# Patient Record
Sex: FEMALE | Race: WHITE | Employment: STUDENT
[De-identification: names, ages, dates, MRNs, and addresses within clinical notes are randomized per-mention and may not be internally consistent; named-entity substitution may affect disease eponyms.]

---
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
## 2022-03-06 ENCOUNTER — HEALTH MAINTENANCE LETTER (OUTPATIENT)
Age: 22
End: 2022-03-06

## 2022-06-06 ASSESSMENT — ANXIETY QUESTIONNAIRES
7. FEELING AFRAID AS IF SOMETHING AWFUL MIGHT HAPPEN: SEVERAL DAYS
4. TROUBLE RELAXING: NEARLY EVERY DAY
8. IF YOU CHECKED OFF ANY PROBLEMS, HOW DIFFICULT HAVE THESE MADE IT FOR YOU TO DO YOUR WORK, TAKE CARE OF THINGS AT HOME, OR GET ALONG WITH OTHER PEOPLE?: NOT DIFFICULT AT ALL
GAD7 TOTAL SCORE: 13
GAD7 TOTAL SCORE: 13
2. NOT BEING ABLE TO STOP OR CONTROL WORRYING: MORE THAN HALF THE DAYS
6. BECOMING EASILY ANNOYED OR IRRITABLE: MORE THAN HALF THE DAYS
3. WORRYING TOO MUCH ABOUT DIFFERENT THINGS: MORE THAN HALF THE DAYS
5. BEING SO RESTLESS THAT IT IS HARD TO SIT STILL: MORE THAN HALF THE DAYS
7. FEELING AFRAID AS IF SOMETHING AWFUL MIGHT HAPPEN: SEVERAL DAYS
1. FEELING NERVOUS, ANXIOUS, OR ON EDGE: SEVERAL DAYS
GAD7 TOTAL SCORE: 13

## 2022-06-08 ENCOUNTER — VIRTUAL VISIT (OUTPATIENT)
Dept: PSYCHOLOGY | Facility: CLINIC | Age: 22
End: 2022-06-08
Payer: COMMERCIAL

## 2022-06-08 DIAGNOSIS — R41.840 ATTENTION DEFICIT: ICD-10-CM

## 2022-06-08 PROCEDURE — 90834 PSYTX W PT 45 MINUTES: CPT | Mod: 95 | Performed by: PSYCHOLOGIST

## 2022-06-12 NOTE — PROGRESS NOTES
"[facility]   Mental Health & Addiction Services     Progress Note - Initial Visit    Patient  Name:  Cookie Wall Date: 22         Service Type: Individual     Visit Start Time: 1:07pm Visit End Time: 1:51pm    Visit #: 1 44 minutes    Attendees: Client attended alone    Service Modality:  Video Visit:      Provider verified identity through the following two step process.  Patient provided:  Patient photo and Patient     Telemedicine Visit: The patient's condition can be safely assessed and treated via synchronous audio and visual telemedicine encounter.      Reason for Telemedicine Visit: Services only offered telehealth    Originating Site (Patient Location): Patient's home    Distant Site (Provider Location): Provider Remote Setting- Home Office    Consent:  The patient/guardian has verbally consented to: the potential risks and benefits of telemedicine (video visit) versus in person care; bill my insurance or make self-payment for services provided; and responsibility for payment of non-covered services.     Patient would like the video invitation sent by:  Send to e-mail at: [email or web address]    Mode of Communication:  Video Conference via [facility]    As the provider I attest to compliance with applicable laws and regulations related to telemedicine.       DATA:   Interactive Complexity: No   Crisis: No     Presenting Concerns/  Current Stressors:   \"I was having struggling with my academics.\"      ASSESSMENT:  Mental Status Assessment:  Appearance:   Appropriate   Eye Contact:   Good   Psychomotor Behavior: Normal   Attitude:   Cooperative  Pleasant  Orientation:   All  Speech   Rate / Production: Talkative   Volume:  Normal   Mood:    Anxious   Affect:    Appropriate   Thought Content:  Clear   Thought Form:  Coherent   Insight:    Good  and Fair       Safety Issues and Plan for Safety and Risk Management:     [place] Suicide Severity Rating Scale " "(Lifetime/Recent)  [place] Suicide Severity Rating (Lifetime/Recent) 6/8/2022   1. Wish to be Dead (Lifetime) 1   Wish to be Dead Description (Lifetime) MRE: [id number] COVID, \"Just the thought of, If I did this what would be the consequence?\"   1. Wish to be Dead (Past 1 Month) 0   2. Non-Specific Active Suicidal Thoughts (Lifetime) 0   Most Severe Ideation Rating (Lifetime) 2   Description of Most Severe Ideation (Lifetime) MRE: [id number] COVID, \"Just the thought of, If I did this what would be the consequence?\"   Description of Most Severe Ideation (Past 1 Month) N/A   Frequency (Lifetime) 1   Duration (Lifetime) 2   Controllability (Lifetime) 1   Deterrents (Lifetime) 1   Deterrents (Past 1 Month) 0   Reasons for Ideation (Lifetime) 0   Actual Attempt (Lifetime) 0   Has subject engaged in non-suicidal self-injurious behavior? (Lifetime) 0   Interrupted Attempts (Lifetime) 0   Aborted or Self-Interrupted Attempt (Lifetime) 0   Preparatory Acts or Behavior (Lifetime) 0   Calculated C-SSRS Risk Score (Lifetime/Recent) No Risk Indicated     Patient denies current fears or concerns for personal safety.  Patient denies current or recent suicidal ideation or behaviors.  Patient denies current or recent homicidal ideation or behaviors.  Patient denies current or recent self injurious behavior or ideation.  Patient denies other safety concerns.  Recommended that patient call 911 or go to the local ED should there be a change in any of these risk factors.  Patient reports there are no firearms in the house.     Diagnostic Criteria:  Attention Deficit Hyperactivity Disorder  A) A persistent pattern of inattention and/or hyperactivity-impulsivity that interferes with functioning or development, as characterized by (1) Inattention and/or (2) Hyperactivity and Impulsivity  - Often fails to give close attention to details or makes careless mistakes in schoolwork, at work, or during other activities  - Often has " "difficulty sustaining attention in tasks or play activities  - Often does not follow through on instructions and fails to finish schoolwork, chores, or duties in the workplace  - Often has difficulty organizing tasks and activities  - Often loses things necessary for tasks or activities  - Is often easily distractedby extraneous stimuli  - Is often forgetful in daily activities  E) The Symptoms do not occur exclusively during the course of schizophrenia or another psychotic disorder and are not better explained by another mental disorder      DSM5 Diagnoses: (Sustained by DSM5 Criteria Listed Above)  Diagnoses: Attention-Deficit/Hyperactivity Disorder  314.00 (F90.0) Predominantly inattentive presentation  (Provisional)  Psychosocial & Contextual Factors: \"I was having struggling with my academics.\"  WHODAS 2.0 (12 item):   WHODAS 2.0 Total Score 6/6/2022   Total Score 30   Total Score [facility] 30     Intervention:   Psychoeducation; Skill review/identification & recommendation; Pattern recognition; Socratic Questioning; Active listening, validation, and other rapport building skills; Administer and explain screeners  Collateral Reports Completed:  Not Applicable      PLAN: (Homework, other):  1. Provider will continue Diagnostic Assessment.  Patient was given the following to do until next session:  The client was sent the MMPI-2 and ADHD questionnaires to complete and return by next session, if possible.    2. Provider recommended the following referrals: None at this time.      3.  Suicide Risk and Safety Concerns were assessed for Cookie Wall.    Patient meets the following risk assessment and triage: Patient denied any current/recent/lifetime history of suicidal ideation and/or behaviors.  No safety plan indicated at this time.       Anthony Ga  June 8, 2022        "

## 2022-06-14 ASSESSMENT — PATIENT HEALTH QUESTIONNAIRE - PHQ9
SUM OF ALL RESPONSES TO PHQ QUESTIONS 1-9: 13
SUM OF ALL RESPONSES TO PHQ QUESTIONS 1-9: 13

## 2022-06-15 ENCOUNTER — VIRTUAL VISIT (OUTPATIENT)
Dept: PSYCHOLOGY | Facility: CLINIC | Age: 22
End: 2022-06-15
Payer: COMMERCIAL

## 2022-06-15 DIAGNOSIS — F33.0 MAJOR DEPRESSIVE DISORDER, RECURRENT EPISODE, MILD (H): Primary | ICD-10-CM

## 2022-06-15 DIAGNOSIS — F41.9 ANXIETY DISORDER, UNSPECIFIED TYPE: ICD-10-CM

## 2022-06-15 DIAGNOSIS — F90.0 ATTENTION DEFICIT HYPERACTIVITY DISORDER, INATTENTIVE TYPE: ICD-10-CM

## 2022-06-15 PROCEDURE — 90791 PSYCH DIAGNOSTIC EVALUATION: CPT | Mod: 95 | Performed by: PSYCHOLOGIST

## 2022-06-15 NOTE — PROGRESS NOTES
[facility]    Provider Name:  Anthony Ga PsyD     Credentials:        UNIVERSAL ADULT ADHD/Mental Health DIAGNOSTIC ASSESSMENT      Patient Name:  Cookie Wall  Date: 6/15/22  PREFERRED NAME: Cookie  PRONOUNS:  she/her/hers     MRN: [id number]  : 2000  ADDRESS: [address] [facility] [address]  ACCT. NUMBER:  [id number]  PREFERRED PHONE: [phone]  May we leave a program related message: Yes       Service Type: Individual      Session Start Time: 5:01pm Session End Time: 5:54pm     Session Length: 53 minutes    Session #: 2    Attendees: Client attended alone    Service Modality:  Video Visit:      Provider verified identity through the following two step process.  Patient provided:  Patient photo    Telemedicine Visit: The patient's condition can be safely assessed and treated via synchronous audio and visual telemedicine encounter.      Reason for Telemedicine Visit: Services only offered telehealth    Originating Site (Patient Location): Patient's home    Distant Site (Provider Location): Provider Remote Setting- Home Office    Consent:  The patient/guardian has verbally consented to: the potential risks and benefits of telemedicine (video visit) versus in person care; bill my insurance or make self-payment for services provided; and responsibility for payment of non-covered services.     Patient would like the video invitation sent by:  Send to e-mail at: [email or web address]    Mode of Communication:  Video Conference via [facility]    As the provider I attest to compliance with applicable laws and regulations related to telemedicine.    DATA  Interactive Complexity: No  Crisis: No      Identifying Information:  Patient is a 21 year old,  .  The pronoun use throughout this assessment reflects the patient's chosen pronoun.  Patient was referred for an assessment by self.  Patient attended the session alone.    Chief Complaint:   The purpose of this  "evaluation is to: clarify diagnosis. Patient reported seeking services at this time for diagnostic assessment and recommendations for treatment.  Patient reported that   she   has not completed a previous ADHD diagnostic assessment.  Patient has received a previous diagnosis of depression. Patient reported that medication has not been prescribed medication to address these problems.     CONCERNS:  \"I was having struggling with my academics.  Any time a teacher started taking I would just stare off in space.  I had fidget cube I would use that helped me listen.  I would tap my hand or pencil to do something.  They (teachers) didn't like it because they called it a distraction.  If I didn't listen to music (ambiance) I wouldn't get my homework done.  It helps me focus.  \"  She recalls struggling with \"tapping my foot all hour\" was in middle-school.  She also recalled struggling quite a bit in school, \"I got C & Ds.  I did well with History and English.\"    Social/Family History:  Patient reported they grew up in [place].  They were raised by biological parents.  Parents were always together.  Patient reported that their childhood was \"pretty happy.\"  Patient described their current relationships with family of origin as (one older brother): \" Getting steadily better.  We were jealous of each other.  The relationship with my parents is a lot better.\"     Patient described her childhood family environment as \"very stable.\"  As a child, patient reported that she failed to complete assigned chores in the home environment, had problems with organization and keeping track of items, misplaced or lost things, forgot school work or other items between home and school, needed frequent reminders by parents to be motivated or to complete work and had difficulty managing personal hygiene. Patient reported difficulty with childhood peer relationships (\"I was called annoying because I was trying to make friends\").     The " "patient describes their cultural background as .  Cultural influences and impact on patient's life structure, values, norms, and healthcare: I grew up Anglican Islam but have recently transitioned to Paganism.  Contextual influences on patient's health include: None reported.    These factors will be addressed in the Preliminary Treatment plan.  Patient identified their preferred language to be English. Patient reported they does not need the assistance of an  or other support involved in therapy.     Patient reported had no significant delays in developmental tasks.   Patient's highest education level was high school graduate  . Patient identified the following learning problems: attention, concentration and \"I was called a problem child.\".  Modifications will not be used to assist communication in therapy.  Patient reports they are able to understand written materials.    Patient did not receive tutoring services during the school years. Patient did not receive special education services. Patient  reported significant behavior and discipline problems including: disruptive classroom behavior and failure to finish or complete homework. Patient did attend post secondary school. She did attend time in college but was unable to complete the work so she is \"taking a gap year.\"    Patient reported the following relationship history of \"two\" committed and significant relationship.  Patient's current relationship status is in a relationship for 3 years.   Patient identified their sexual orientation as bi-sexual.  Patient reported having zero child(jossy). Patient identified partner; mother; father; siblings; friends as part of their support system.  Patient identified the quality of these relationships as good.      Patient's current living/housing situation involves staying with someone. The immediate members of family and household include Michelle Wall, 46,Mother , father, and brother, and they " "report that housing is stable    Patient is currently employed part time.  Patient reports their finances are obtained through employment; parents. The patient's work history includes: \" I worked at [employer] for 3 1/2 months during the summer than at [employer] for about a year.  When we moved I started working at [employer] and I was having trouble with a co-worker and could only work every other week, my coworker said all I did was talk when it was her that was always talking.\"  The longest period of employment has been 1 year.  Client has been terminated from a place of employment ([employer]).  Patient does not identify finances as a current stressor.      Patient reported that theyhave not been involved with the legal system. Patient does not being under probation/ parole/ jurisdiction. They are not under any current court jurisdiction. .    Patient has not received a 's license.  Patient N/A.  Patient reported the following driving habits: N/A.  According to client, other people N/A comfortable riding as passengers when she is driving.    Patient's Strengths and Limitations:  Patient identified the following strengths or resources that will help them succeed in treatment: commitment to health and well being, family support and Assertiveness. Things that may interfere with the patient's success in treatment include: \"I think just forgetting going to therapy or I forget what meds to take.\".     Assessments:  The following assessments were completed by patient for this visit:  PHQ9:   PHQ-9 SCORE 3/9/2021 6/8/2022 6/14/2022   PHQ-9 Total Score MyChart - - 13 (Moderate depression)   PHQ-9 Total Score 18 14 13     GAD7:   MAIKOL-7 SCORE 6/6/2022   Total Score 13 (moderate anxiety)   Total Score 13     CAGE-AID:   CAGE-AID Total Score 6/6/2022   Total Score 0   Total Score MyChart 0 (A total score of 2 or greater is considered clinically significant)     PROMIS 10-Global Health (all questions and " answers displayed):   PROMIS 10 6/8/2022 6/15/2022   In general, would you say your health is: - Good   In general, would you say your quality of life is: - Excellent   In general, how would you rate your physical health? - Good   In general, how would you rate your mental health, including your mood and your ability to think? - Fair   In general, how would you rate your satisfaction with your social activities and relationships? - Good   In general, please rate how well you carry out your usual social activities and roles - Good   To what extent are you able to carry out your everyday physical activities such as walking, climbing stairs, carrying groceries, or moving a chair? - Completely   How often have you been bothered by emotional problems such as feeling anxious, depressed or irritable? - Never   How would you rate your fatigue on average? - None   How would you rate your pain on average?   0 = No Pain  to  10 = Worst Imaginable Pain - 0   In general, would you say your health is: 4 3   In general, would you say your quality of life is: 5 5   In general, how would you rate your physical health? 4 3   In general, how would you rate your mental health, including your mood and your ability to think? 3 2   In general, how would you rate your satisfaction with your social activities and relationships? 3 3   In general, please rate how well you carry out your usual social activities and roles. (This includes activities at home, at work and in your community, and responsibilities as a parent, child, spouse, employee, friend, etc.) 1 3   To what extent are you able to carry out your everyday physical activities such as walking, climbing stairs, carrying groceries, or moving a chair? 5 5   In the past 7 days, how often have you been bothered by emotional problems such as feeling anxious, depressed, or irritable? 3 1   In the past 7 days, how would you rate your fatigue on average? 2 1   In the past 7 days, how would  "you rate your pain on average, where 0 means no pain, and 10 means worst imaginable pain? 0 0   Global Mental Health Score 14 15   Global Physical Health Score 18 18   PROMIS TOTAL - SUBSCORES 32 33   Some recent data might be hidden     [place] Suicide Severity Rating Scale (Lifetime/Recent)  [place] Suicide Severity Rating (Lifetime/Recent) 6/8/2022   1. Wish to be Dead (Lifetime) 1   Wish to be Dead Description (Lifetime) MRE: [id number] COVID, \"Just the thought of, If I did this what would be the consequence?\"   1. Wish to be Dead (Past 1 Month) 0   2. Non-Specific Active Suicidal Thoughts (Lifetime) 0   Most Severe Ideation Rating (Lifetime) 2   Description of Most Severe Ideation (Lifetime) MRE: [id number] COVID, \"Just the thought of, If I did this what would be the consequence?\"   Description of Most Severe Ideation (Past 1 Month) N/A   Frequency (Lifetime) 1   Duration (Lifetime) 2   Controllability (Lifetime) 1   Deterrents (Lifetime) 1   Deterrents (Past 1 Month) 0   Reasons for Ideation (Lifetime) 0   Actual Attempt (Lifetime) 0   Has subject engaged in non-suicidal self-injurious behavior? (Lifetime) 0   Interrupted Attempts (Lifetime) 0   Aborted or Self-Interrupted Attempt (Lifetime) 0   Preparatory Acts or Behavior (Lifetime) 0   Calculated C-SSRS Risk Score (Lifetime/Recent) No Risk Indicated       Personal and Family Medical History:  Patient does report a family history of mental health concerns.  Patient reports family history includes Allergies in her father and mother; Anxiety Disorder in her mother; Autism Spectrum Disorder in her brother; Breast Cancer in her paternal grandmother; Cancer in an other family member; Depression in her brother and mother; Depression/Anxiety in her mother; Diabetes in her mother; Heart Disease in her father and other family members; Hyperlipidemia in her father and paternal grandfather; Hypertension in her father, maternal grandfather, paternal " "grandfather, and another family member; Mental Illness in her brother and mother; Obesity in her mother; Other Cancer in her paternal grandfather..     Patient does report Mental Health Diagnosis and/or Treatment.  Patient Patient reported the following previous diagnoses which include(s): an Anxiety Disorder and Depression.  Patient reported symptoms began: DEPRESSION (MDD): \"I think it was after I started middle-school.  My grandpa and cousin , back-to-back, from cancer.  \"I don't feel anything.  I feel numb.\" Loses interest, feel down, isolate/withdraw, \"just stay in my room,\" \"it goes off-and-on, sometimes 2-3 days, sometimes 2-weeks, incr appetite, incr sleep, lowered self-esteem, irritable.  ANXIETY (Unspecified Anxiety Disorder): \"I think it happens with things that are out of my control.  I used to get more anxious when I had to ask for help from someone.  In person, \"I'm afraid I'm asking a dumb question.\"  It doesn't always happen. \"I worry I might be boring, or I'll saying something embarrassing.\"  Social anxiety \"interferes a little bit (with life).\"  Patient has received mental health services in the past: \"therapy for a few months\".  Psychiatric Hospitalizations: None.  Patient denies a history of civil commitment.  Patient is not receiving other mental health services.  These include none.         Patient has had a physical exam to rule out medical causes for current symptoms.  Date of last physical exam was within the past year. Client was encouraged to follow up with PCP if symptoms were to develop. The patient has a [place] Primary Care Provider, who is named Lauro Haile..  Patient reports no current medical concerns.  Patient denies any issues with pain..   There are not significant appetite / nutritional concerns / weight changes.   Patient does not report a history of head injury / trauma / cognitive impairment.    Patient reports not taking any current medications    Medication " "Adherence:  Patient reports not currently prescribed.  N/A.    Patient Allergies:    Allergies   Allergen Reactions     Cats Shortness Of Breath and Hives     Grass Itching     Trees      Sneezing, runny nose     Dogs Hives and Itching     Runny nose,      Dust Mites      Itchy eyes        Medical History:    Past Medical History:   Diagnosis Date     Acute bronchiolitis due to other infectious organisms 12/6/2003     Anal fissure 11/4/2005     Bacteremia 12/6/2003    Moraxella sp.     FEBRILE SEIZURE 12/3/2003     Pneumonia, organism unspecified(486) 12/6/2003         Current Mental Status Exam:   Appearance:  Appropriate    Eye Contact:  Fair   Psychomotor:  Normal       Gait / station:  Unable to observe via video session  Attitude / Demeanor: Cooperative  Friendly Pleasant  Speech      Rate / Production: Talkative      Volume:  Normal  volume      Language:  intact  Mood:   Normal  Affect:   Bright    Thought Content: Clear   Thought Process: Coherent       Associations: No loosening of associations  Insight:   Good  and Fair   Judgment:  Intact   Orientation:  All  Attention/concentration: Good      Substance Use:  Patient did report a family history of substance use concerns; see medical history section for details (\"My great grandma, near the end of her life, had a really bad alcohol problem\").  Patient has not received chemical dependency treatment in the past.  Patient has not ever been to detox.      Patient is not currently receiving any chemical dependency treatment.           Substance History of use Age of first use Date of last use     Pattern and duration of use (include amounts and frequency)   Alcohol never used       REPORTS SUBSTANCE USE: N/A   Cannabis   never used     REPORTS SUBSTANCE USE: N/A     Amphetamines   never used     REPORTS SUBSTANCE USE: N/A   Cocaine/crack    never used       REPORTS SUBSTANCE USE: N/A   Hallucinogens never used         REPORTS SUBSTANCE USE: N/A   Inhalants never " "used         REPORTS SUBSTANCE USE: N/A   Heroin never used         REPORTS SUBSTANCE USE: N/A   Other Opiates never used     REPORTS SUBSTANCE USE: N/A   Benzodiazepine   never used     REPORTS SUBSTANCE USE: N/A   Barbiturates never used     REPORTS SUBSTANCE USE: N/A   Over the counter meds used in the past 3 months ago whenbthe bottle of Claritin was bought 6/6/2022 REPORTS SUBSTANCE USE: N/A   Caffeine currently use 4 years ago   REPORTS SUBSTANCE USE: reports using substance 1 times per day and has 1 coffee at a time.   Patient reports heaviest use was \"when I was younger, maybe 16.\".   Nicotine  never used     REPORTS SUBSTANCE USE: N/A   Other substances not listed above:  Identify:  never used     REPORTS SUBSTANCE USE: N/A     Patient reported the following problems as a result of their substance use: no problems, not applicable.    Substance Use: No symptoms    Based on the negative CAGE score and clinical interview there  are not indications of drug or alcohol abuse.      Significant Losses / Trauma / Abuse / Neglect Issues:   Patient did not serve in the .  There are indications or report of significant loss, trauma, abuse or neglect issues related to: death of \"My grandpa and cousin.  My great grandma.  I never really knew her but I heard alot of stories.\"  Concerns for possible neglect are not present.    Safety Assessment:   Patient denies current homicidal ideation and behaviors.  Patient denies current self-injurious ideation and behaviors.    Patient denied risk behaviors associated with substance use.  Patient denies any high risk behaviors associated with mental health symptoms.  Patient reports the following current concerns for their personal safety: None.  Patient reports there are not firearms in the house.    History of Safety Concerns:  Patient denied a history of homicidal ideation.     Patient denied a history of personal safety concerns.    Patient denied a history of assaultive " behaviors.    Patient denied a history of sexual assault behaviors.     Patient denied a history of risk behaviors associated with substance use.  Patient denies any history of high risk behaviors associated with mental health symptoms.  Patient reports the following protective factors: safe and stable environment; regular sleep; regular physical activity; living with other people; daily obligations; financial stability; strong sense of self worth or esteem    Risk Plan:  See Recommendations for Safety and Risk Management Plan    Review of Symptoms per patient report:  Depression: Change in sleep, Lack of interest, Excessive or inappropriate guilt, Change in energy level, Difficulties concentrating, Change in appetite, Psychomotor slowing or agitation, Low self-worth, Ruminations, Irritability, Feeling sad, down, or depressed and Withdrawn  Jessica:  No Symptoms  Psychosis: No Symptoms  Anxiety: Excessive worry, Nervousness, Ruminations, Poor concentration, Irritability and Trouble relaxing; Being restless; Feeling afraid  Panic:  No symptoms  Post Traumatic Stress Disorder:  No Symptoms   Eating Disorder: No Symptoms  ADD / ADHD:  Inattentive, Poor task completion, Poor organizational skills, Distractibility and Forgetful  Conduct Disorder: No symptoms  Autism Spectrum Disorder: No symptoms  Obsessive Compulsive Disorder: No Symptoms    Patient reports the following compulsive behaviors and treatment history: None reported.      Diagnostic Criteria:   Unspecified Anxiety Disorder , Symptoms characteristic of an anxiety disorder that caused clinically significant distress or impairment in social, occupational, or other important areas of functioning predominate but do not meet the full criteria for any of the disorders of the anxiety disorders diagnostic class. Major Depressive Disorder  CRITERIA (A-C) REPRESENT A MAJOR DEPRESSIVE EPISODE - SELECT THESE CRITERIA  A) Recurrent episode(s) - symptoms have been present  during the same 2-week period and represent a change from previous functioning 5 or more symptoms (required for diagnosis)   - Depressed mood. Note: In children and adolescents, can be irritable mood.     - Diminished interest or pleasure in all, or almost all, activities.    - Psychomotor activity retardation.    - Fatigue or loss of energy.    - Feelings of worthlessness or excessive guilt.    - Diminished ability to think or concentrate, or indecisiveness.   B) The symptoms cause clinically significant distress or impairment in social, occupational, or other important areas of functioning  C) The episode is not attributable to the physiological effects of a substance or to another medical condition  D) The occurence of major depressive episode is not better explained by other thought / psychotic disorders  E) There has never been a manic episode or hypomanic episode    Functional Status:  Patient reports the following functional impairments:  academic performance, educational activities, management of the household and or completion of tasks and work / vocational responsibilities.     Nonprogrammatic care:  Patient is requesting basic services to address current mental health concerns.    Clinical Summary:  1. Reason for assessment: Clarify ADHD diagnosis.  2. Psychosocial, Cultural and Contextual Factors: Client is working part-time and living with her parents.  3. 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  4. Other Diagnoses that is relevant to services:   300.00 (F41.9) Unspecified Anxiety Disorder.  5. Provisional Diagnosis:  Attention-Deficit/Hyperactivity Disorder  314.00 (F90.0) Predominantly inattentive presentation as evidenced by difficulties with memory, concentration, focus, and easily distracted, since childhood.  6. Prognosis: Expect Improvement.  7. Likely consequences of  symptoms if not treated: If left untreated, the client is likely to struggle with maintaining gainful employment, completing post-high school schooling, and a healthy social network.  8. Client strengths include:  employed, good listener, motivated, open to learning, open to suggestions / feedback and support of family, friends and providers .     Recommendations:     1. Plan for Safety and Risk Management:   Recommended that patient call 911 or go to the local ED should there be a change in any of these risk factors..          Report to child / adult protection services was NA.     2. Patient's identified no cultural influences to address at this time.     3. Initial Treatment will focus on:    ADHD Testing:  Patient was given self and collaborative rating scales to be completed prior to the next appointment.  Depression and anxiety rating scales were completed.  Copies of previous report cards were requested. .     4. Resources/Service Plan:    services are not indicated.   Modifications to assist communication are not indicated.   Additional disability accommodations are not indicated.      5. Collaboration:   Collaboration / coordination of treatment will be initiated with the following support professionals: None at this time.      6.  Referrals:   The following referral(s) will be initiated: None at this time. Next Scheduled Appointment: August 3, 2022.     A Release of Information has been obtained for the following: None at this time.    7. INGRIS:    INGRIS:  Discussed the general effects of drugs and alcohol on health and well-being. Provider gave patient printed information about the effects of chemical use on their health and well being. Recommendations:  Continue with ADHD assessment.     8. Records:   These were reviewed at time of assessment.   Information in this assessment was obtained from the medical record and provided by patient who is a good and fair historian.      Patient will have open  access to their mental health medical record.        Provider Name/ Credentials:  Anthony Ga PsyD, DONYA  Farnaz 15, 2022

## 2022-09-28 ENCOUNTER — TRANSFERRED RECORDS (OUTPATIENT)
Dept: HEALTH INFORMATION MANAGEMENT | Facility: CLINIC | Age: 22
End: 2022-09-28

## 2022-11-21 ENCOUNTER — HEALTH MAINTENANCE LETTER (OUTPATIENT)
Age: 22
End: 2022-11-21

## 2022-12-01 ENCOUNTER — TELEPHONE (OUTPATIENT)
Dept: FAMILY MEDICINE | Facility: CLINIC | Age: 22
End: 2022-12-01

## 2022-12-01 NOTE — TELEPHONE ENCOUNTER
No appts scheduled.  Sent patient mychart message indicating she is due for physical and then also coming due for PHQ9-questionaire was attached for completion.  Karrie Walters,

## 2022-12-01 NOTE — TELEPHONE ENCOUNTER
Patient Quality Outreach    Patient is due for the following:   Depression  -  PHQ-9 needed after December 15th 2022    Next Steps:   route to Dr. River for review    Type of outreach:    none at this time      Questions for provider review:    Looks like last visit was 8-20-21 with you. Patient on PM for phq 9 soon. Looks like seeing psychology. Do we still need to follow her for depression if that's the case?     Sandor Encarnacion, [facility]  Chart routed to Provider.

## 2023-01-24 SDOH — ECONOMIC STABILITY: FOOD INSECURITY: WITHIN THE PAST 12 MONTHS, YOU WORRIED THAT YOUR FOOD WOULD RUN OUT BEFORE YOU GOT MONEY TO BUY MORE.: NEVER TRUE

## 2023-01-24 SDOH — ECONOMIC STABILITY: TRANSPORTATION INSECURITY
IN THE PAST 12 MONTHS, HAS THE LACK OF TRANSPORTATION KEPT YOU FROM MEDICAL APPOINTMENTS OR FROM GETTING MEDICATIONS?: NO

## 2023-01-24 SDOH — ECONOMIC STABILITY: INCOME INSECURITY: HOW HARD IS IT FOR YOU TO PAY FOR THE VERY BASICS LIKE FOOD, HOUSING, MEDICAL CARE, AND HEATING?: NOT HARD AT ALL

## 2023-01-24 SDOH — ECONOMIC STABILITY: FOOD INSECURITY: WITHIN THE PAST 12 MONTHS, THE FOOD YOU BOUGHT JUST DIDN'T LAST AND YOU DIDN'T HAVE MONEY TO GET MORE.: NEVER TRUE

## 2023-01-24 SDOH — ECONOMIC STABILITY: INCOME INSECURITY: IN THE LAST 12 MONTHS, WAS THERE A TIME WHEN YOU WERE NOT ABLE TO PAY THE MORTGAGE OR RENT ON TIME?: NO

## 2023-01-24 SDOH — ECONOMIC STABILITY: TRANSPORTATION INSECURITY
IN THE PAST 12 MONTHS, HAS LACK OF TRANSPORTATION KEPT YOU FROM MEETINGS, WORK, OR FROM GETTING THINGS NEEDED FOR DAILY LIVING?: NO

## 2023-01-24 SDOH — HEALTH STABILITY: PHYSICAL HEALTH: ON AVERAGE, HOW MANY DAYS PER WEEK DO YOU ENGAGE IN MODERATE TO STRENUOUS EXERCISE (LIKE A BRISK WALK)?: 6 DAYS

## 2023-01-24 SDOH — HEALTH STABILITY: PHYSICAL HEALTH: ON AVERAGE, HOW MANY MINUTES DO YOU ENGAGE IN EXERCISE AT THIS LEVEL?: 80 MIN

## 2023-01-24 ASSESSMENT — SOCIAL DETERMINANTS OF HEALTH (SDOH)
IN A TYPICAL WEEK, HOW MANY TIMES DO YOU TALK ON THE PHONE WITH FAMILY, FRIENDS, OR NEIGHBORS?: PATIENT DECLINED
HOW OFTEN DO YOU GET TOGETHER WITH FRIENDS OR RELATIVES?: NEVER
DO YOU BELONG TO ANY CLUBS OR ORGANIZATIONS SUCH AS CHURCH GROUPS UNIONS, FRATERNAL OR ATHLETIC GROUPS, OR SCHOOL GROUPS?: NO
HOW OFTEN DO YOU ATTEND CHURCH OR RELIGIOUS SERVICES?: NEVER

## 2023-01-24 ASSESSMENT — ENCOUNTER SYMPTOMS
NAUSEA: 0
MYALGIAS: 0
FREQUENCY: 0
CONSTIPATION: 0
HEADACHES: 0
PALPITATIONS: 0
FEVER: 0
DIZZINESS: 0
EYE PAIN: 0
SHORTNESS OF BREATH: 0
DIARRHEA: 0
HEARTBURN: 0
BREAST MASS: 0
HEMATURIA: 0
ARTHRALGIAS: 0
PARESTHESIAS: 0
HEMATOCHEZIA: 0
CHILLS: 0
SORE THROAT: 0
WEAKNESS: 0
ABDOMINAL PAIN: 0
DYSURIA: 0
NERVOUS/ANXIOUS: 0
JOINT SWELLING: 0
COUGH: 0

## 2023-01-24 ASSESSMENT — LIFESTYLE VARIABLES
HOW OFTEN DO YOU HAVE A DRINK CONTAINING ALCOHOL: NEVER
AUDIT-C TOTAL SCORE: 0
HOW MANY STANDARD DRINKS CONTAINING ALCOHOL DO YOU HAVE ON A TYPICAL DAY: PATIENT DOES NOT DRINK
HOW OFTEN DO YOU HAVE SIX OR MORE DRINKS ON ONE OCCASION: NEVER
SKIP TO QUESTIONS 9-10: 1

## 2023-01-24 ASSESSMENT — PATIENT HEALTH QUESTIONNAIRE - PHQ9
SUM OF ALL RESPONSES TO PHQ QUESTIONS 1-9: 13
10. IF YOU CHECKED OFF ANY PROBLEMS, HOW DIFFICULT HAVE THESE PROBLEMS MADE IT FOR YOU TO DO YOUR WORK, TAKE CARE OF THINGS AT HOME, OR GET ALONG WITH OTHER PEOPLE: SOMEWHAT DIFFICULT
SUM OF ALL RESPONSES TO PHQ QUESTIONS 1-9: 13

## 2023-01-25 ENCOUNTER — OFFICE VISIT (OUTPATIENT)
Dept: FAMILY MEDICINE | Facility: CLINIC | Age: 23
End: 2023-01-25
Payer: COMMERCIAL

## 2023-01-25 VITALS
OXYGEN SATURATION: 99 % | BODY MASS INDEX: 29.24 KG/M2 | HEIGHT: 61 IN | SYSTOLIC BLOOD PRESSURE: 133 MMHG | DIASTOLIC BLOOD PRESSURE: 78 MMHG | HEART RATE: 82 BPM | TEMPERATURE: 98 F | RESPIRATION RATE: 18 BRPM | WEIGHT: 154.9 LBS

## 2023-01-25 DIAGNOSIS — F32.9 MAJOR DEPRESSIVE DISORDER WITH SINGLE EPISODE, REMISSION STATUS UNSPECIFIED: ICD-10-CM

## 2023-01-25 DIAGNOSIS — Z13.6 CARDIOVASCULAR SCREENING; LDL GOAL LESS THAN 130: ICD-10-CM

## 2023-01-25 DIAGNOSIS — F43.21 ADJUSTMENT DISORDER WITH DEPRESSED MOOD: ICD-10-CM

## 2023-01-25 DIAGNOSIS — Z00.00 ROUTINE HISTORY AND PHYSICAL EXAMINATION OF ADULT: ICD-10-CM

## 2023-01-25 DIAGNOSIS — Z83.3 FAMILY HISTORY OF DIABETES MELLITUS: Primary | ICD-10-CM

## 2023-01-25 DIAGNOSIS — Z83.49 FAMILY HISTORY OF ENDOCRINE DISORDER: ICD-10-CM

## 2023-01-25 LAB
ERYTHROCYTE [DISTWIDTH] IN BLOOD BY AUTOMATED COUNT: 12.3 % (ref 10–15)
HBA1C MFR BLD: 5.3 % (ref 0–5.6)
HCT VFR BLD AUTO: 43.7 % (ref 35–47)
HGB BLD-MCNC: 14.3 G/DL (ref 11.7–15.7)
MCH RBC QN AUTO: 29.5 PG (ref 26.5–33)
MCHC RBC AUTO-ENTMCNC: 32.7 G/DL (ref 31.5–36.5)
MCV RBC AUTO: 90 FL (ref 78–100)
PLATELET # BLD AUTO: 284 10E3/UL (ref 150–450)
RBC # BLD AUTO: 4.84 10E6/UL (ref 3.8–5.2)
WBC # BLD AUTO: 7.5 10E3/UL (ref 4–11)

## 2023-01-25 PROCEDURE — 99213 OFFICE O/P EST LOW 20 MIN: CPT | Mod: 25 | Performed by: FAMILY MEDICINE

## 2023-01-25 PROCEDURE — 99395 PREV VISIT EST AGE 18-39: CPT | Mod: 25 | Performed by: FAMILY MEDICINE

## 2023-01-25 PROCEDURE — 0124A COVID-19 VACCINE BIVALENT BOOSTER 12+ (PFIZER): CPT | Performed by: FAMILY MEDICINE

## 2023-01-25 PROCEDURE — 80053 COMPREHEN METABOLIC PANEL: CPT | Performed by: FAMILY MEDICINE

## 2023-01-25 PROCEDURE — 85027 COMPLETE CBC AUTOMATED: CPT | Performed by: FAMILY MEDICINE

## 2023-01-25 PROCEDURE — 90686 IIV4 VACC NO PRSV 0.5 ML IM: CPT | Performed by: FAMILY MEDICINE

## 2023-01-25 PROCEDURE — 36415 COLL VENOUS BLD VENIPUNCTURE: CPT | Performed by: FAMILY MEDICINE

## 2023-01-25 PROCEDURE — 83036 HEMOGLOBIN GLYCOSYLATED A1C: CPT | Performed by: FAMILY MEDICINE

## 2023-01-25 PROCEDURE — 80061 LIPID PANEL: CPT | Performed by: FAMILY MEDICINE

## 2023-01-25 PROCEDURE — 90471 IMMUNIZATION ADMIN: CPT | Performed by: FAMILY MEDICINE

## 2023-01-25 PROCEDURE — 91312 COVID-19 VACCINE BIVALENT BOOSTER 12+ (PFIZER): CPT | Performed by: FAMILY MEDICINE

## 2023-01-25 PROCEDURE — 84443 ASSAY THYROID STIM HORMONE: CPT | Performed by: FAMILY MEDICINE

## 2023-01-25 ASSESSMENT — ENCOUNTER SYMPTOMS
PALPITATIONS: 0
SORE THROAT: 0
NERVOUS/ANXIOUS: 0
HEMATOCHEZIA: 0
FREQUENCY: 0
HEARTBURN: 0
EYE PAIN: 0
HEMATURIA: 0
WEAKNESS: 0
CHILLS: 0
MYALGIAS: 0
DIZZINESS: 0
FEVER: 0
DIARRHEA: 0
NAUSEA: 0
CONSTIPATION: 0
SHORTNESS OF BREATH: 0
JOINT SWELLING: 0
DYSURIA: 0
COUGH: 0
BREAST MASS: 0
PARESTHESIAS: 0
ABDOMINAL PAIN: 0
HEADACHES: 0
ARTHRALGIAS: 0

## 2023-01-25 ASSESSMENT — ANXIETY QUESTIONNAIRES
GAD7 TOTAL SCORE: 8
IF YOU CHECKED OFF ANY PROBLEMS ON THIS QUESTIONNAIRE, HOW DIFFICULT HAVE THESE PROBLEMS MADE IT FOR YOU TO DO YOUR WORK, TAKE CARE OF THINGS AT HOME, OR GET ALONG WITH OTHER PEOPLE: SOMEWHAT DIFFICULT
7. FEELING AFRAID AS IF SOMETHING AWFUL MIGHT HAPPEN: MORE THAN HALF THE DAYS
7. FEELING AFRAID AS IF SOMETHING AWFUL MIGHT HAPPEN: MORE THAN HALF THE DAYS
GAD7 TOTAL SCORE: 8
1. FEELING NERVOUS, ANXIOUS, OR ON EDGE: MORE THAN HALF THE DAYS
5. BEING SO RESTLESS THAT IT IS HARD TO SIT STILL: SEVERAL DAYS
3. WORRYING TOO MUCH ABOUT DIFFERENT THINGS: SEVERAL DAYS
6. BECOMING EASILY ANNOYED OR IRRITABLE: SEVERAL DAYS
4. TROUBLE RELAXING: NOT AT ALL
2. NOT BEING ABLE TO STOP OR CONTROL WORRYING: SEVERAL DAYS
8. IF YOU CHECKED OFF ANY PROBLEMS, HOW DIFFICULT HAVE THESE MADE IT FOR YOU TO DO YOUR WORK, TAKE CARE OF THINGS AT HOME, OR GET ALONG WITH OTHER PEOPLE?: SOMEWHAT DIFFICULT

## 2023-01-25 NOTE — PROGRESS NOTES
SUBJECTIVE:   CC: Cookie is an 22 year old who presents for preventive health visit.     She does not have any concerns today.   She is interested in losing more weight.   Last summer she lost 20 lbs with regular 4 mile walks but has gained back 4 lbs during the winter.       She also mentions that she has seen therapist in the past and would like to try that again.   She would be willing to consider medication for her mood if the therapist thought it would be helpful.         Patient has been advised of split billing requirements and indicates understanding: Yes  Healthy Habits:     Getting at least 3 servings of Calcium per day:  NO    Bi-annual eye exam:  Yes    Dental care twice a year:  NO    Sleep apnea or symptoms of sleep apnea:  None    Diet:  Regular (no restrictions)    Frequency of exercise:  None    Taking medications regularly:  Yes    Medication side effects:  Not applicable    PHQ-2 Total Score: 3    Additional concerns today:  No        Today's PHQ-2 Score:   PHQ-2 ( 1999 [employer]) 1/24/2023   Q1: Little interest or pleasure in doing things 2   Q2: Feeling down, depressed or hopeless 1   PHQ-2 Score 3   PHQ-2 Total Score (12-17 Years)- Positive if 3 or more points; Administer PHQ-A if positive -   Q1: Little interest or pleasure in doing things More than half the days   Q2: Feeling down, depressed or hopeless Several days   PHQ-2 Score 3       Have you ever done Advance Care Planning? (For example, a Health Directive, POLST, or a discussion with a medical provider or your loved ones about your wishes): No, advance care planning information given to patient to review.  Patient plans to discuss their wishes with loved ones or provider.      Social History     Tobacco Use     Smoking status: Never     Smokeless tobacco: Never   Substance Use Topics     Alcohol use: Never     Alcohol/week: 0.0 standard drinks         Alcohol Use 1/24/2023   Prescreen: >3 drinks/day or >7 drinks/week? No       Reviewed  orders with patient.  Reviewed health maintenance and updated orders accordingly - Yes  Labs reviewed in EPIC    Breast Cancer Screening:        History of abnormal Pap smear: NO - under age 21, PAP not appropriate for age     Reviewed and updated as needed this visit by clinical staff                  Reviewed and updated as needed this visit by Provider                 Past Medical History:   Diagnosis Date     Acute bronchiolitis due to other infectious organisms 12/06/2003     Allergy, initial encounter 2016    sees allergies for allergy shot     Anal fissure 11/04/2005     Bacteremia 12/06/2003    Moraxella sp.     FEBRILE SEIZURE 12/03/2003     Pneumonia, organism unspecified(486) 12/06/2003       Past Surgical History:   Procedure Laterality Date     INSERT CONTRACEPTIVE IMPLANT - NEXPLANON/IMPLANON  03/28/2021    nexplanon - insertion       MEDICATIONS:  Current Outpatient Medications   Medication     albuterol (PROAIR HFA/PROVENTIL HFA/VENTOLIN HFA) 108 (90 Base) MCG/ACT inhaler     etonogestrel (NEXPLANON) 68 MG IMPL     Current Facility-Administered Medications   Medication     etonogestrel (NEXPLANON) subdermal implant 68 mg       SOCIAL HISTORY:  Social History     Tobacco Use     Smoking status: Never     Smokeless tobacco: Never   Substance Use Topics     Alcohol use: Never     Alcohol/week: 0.0 standard drinks       Family History   Problem Relation Age of Onset     Allergies Mother      Depression/Anxiety Mother      Mental Illness Mother         Anxiety, Bulimia     Obesity Mother      Diabetes Mother      Depression Mother      Anxiety Disorder Mother         MAIKOL     Allergies Father      Hypertension Father      Hyperlipidemia Father      Heart Disease Father         irregular heart beats     Autism Spectrum Disorder Brother      Depression Brother         Dysthimia     Mental Illness Brother         ASD-Aspberger's/ADHD/Learning disability     Hypertension Maternal Grandfather      Breast Cancer  "Paternal Grandmother 39     Hypertension Paternal Grandfather      Other Cancer Paternal Grandfather         lung cancer     Hyperlipidemia Paternal Grandfather      Hypertension Other         great grandfather     Cancer Other         great grandmother on mom side; breast and lung     Heart Disease Other         great grandmother and both grandfathers on dad side     Heart Disease Other         great grandfather mom side    heart disease         Review of Systems   Constitutional: Negative for chills and fever.   HENT: Negative for congestion, ear pain, hearing loss and sore throat.    Eyes: Negative for pain and visual disturbance.   Respiratory: Negative for cough and shortness of breath.    Cardiovascular: Negative for chest pain, palpitations and peripheral edema.   Gastrointestinal: Negative for abdominal pain, constipation, diarrhea, heartburn, hematochezia and nausea.   Breasts:  Negative for tenderness, breast mass and discharge.   Genitourinary: Negative for dysuria, frequency, genital sores, hematuria, pelvic pain, urgency, vaginal bleeding and vaginal discharge.   Musculoskeletal: Negative for arthralgias, joint swelling and myalgias.   Skin: Negative for rash.   Neurological: Negative for dizziness, weakness, headaches and paresthesias.   Psychiatric/Behavioral: Negative for mood changes. The patient is not nervous/anxious.           OBJECTIVE:   /78 (BP Location: Right arm, Patient Position: Sitting, Cuff Size: Adult Regular)   Pulse 82   Temp 98  F (36.7  C) (Oral)   Resp 18   Ht 1.56 m (5' 1.42\")   Wt 70.3 kg (154 lb 14.4 oz)   LMP 01/17/2023   SpO2 99%   BMI 28.87 kg/m    Physical Exam  GENERAL: healthy, alert and no distress  EYES: Eyes grossly normal to inspection, PERRL and conjunctivae and sclerae normal  HENT: ear canals and TM's normal, nose and mouth without ulcers or lesions  NECK: no adenopathy, no asymmetry, masses, or scars and thyroid normal to palpation  RESP: lungs clear " to auscultation - no rales, rhonchi or wheezes  BREAST: normal without masses, tenderness or nipple discharge and no palpable axillary masses or adenopathy  CV: regular rate and rhythm, normal S1 S2, no S3 or S4, no murmur, click or rub, no peripheral edema and peripheral pulses strong  ABDOMEN: soft, nontender, no hepatosplenomegaly, no masses and bowel sounds normal  MS: no gross musculoskeletal defects noted, no edema  SKIN: no suspicious lesions or rashes  NEURO: Normal strength and tone, mentation intact and speech normal  PSYCH: mentation appears normal, affect normal/bright  LYMPH: no cervical, supraclavicular, axillary, or inguinal adenopathy    Diagnostic Test Results:  Labs reviewed in Epic    ASSESSMENT/PLAN:   (Z83.3) Family history of diabetes mellitus  (primary encounter diagnosis)  Comment:   Plan: Hemoglobin A1c            (Z83.49) Family history of endocrine disorder  Comment:   Plan: TSH with free T4 reflex            (Z00.00) Routine history and physical examination of adult  Comment:   Plan: COVID-19 VACCINE BIVALENT BOOSTER 12+ (PFIZER),        REVIEW OF HEALTH MAINTENANCE PROTOCOL ORDERS,         INFLUENZA VACCINE >6 MONTHS (AFLURIA/FLUZONE),         CBC with platelets, Comprehensive metabolic         panel (BMP + Alb, Alk Phos, ALT, AST, Total.         Bili, TP)        Discussed diet and exercise    (F43.21) Adjustment disorder with depressed mood  Comment: will sent for therapy and schedule visit in 2-3 months to discuss medication if needed  Plan: Adult Mental Health  Referral,         Comprehensive metabolic panel (BMP + Alb, Alk         Phos, ALT, AST, Total. Bili, TP)            (Z13.6) CARDIOVASCULAR SCREENING; LDL GOAL LESS THAN 130  Comment:   Plan: Lipid panel reflex to direct LDL Fasting,         Comprehensive metabolic panel (BMP + Alb, Alk         Phos, ALT, AST, Total. Bili, TP)            (F32.9) Major depressive disorder with single episode, remission status  unspecified  Comment: see above  Plan: Adult Mental Health  Referral                    COUNSELING:  Reviewed preventive health counseling, as reflected in patient instructions  Special attention given to:        Regular exercise       Healthy diet/nutrition       Immunizations    Vaccinated for: Covid-19 and Influenza    Discussed advanced planning         Colorectal Cancer Screening        She reports that she has never smoked. She has never used smokeless tobacco.      Casi Hernandez MD  [facility]  Answers for HPI/ROS submitted by the patient on 1/24/2023  If you checked off any problems, how difficult have these problems made it for you to do your work, take care of things at home, or get along with other people?: Somewhat difficult  PHQ9 TOTAL SCORE: 13  MAIKOL 7 TOTAL SCORE: 8

## 2023-01-26 LAB
ALBUMIN SERPL BCG-MCNC: 4.6 G/DL (ref 3.5–5.2)
ALP SERPL-CCNC: 76 U/L (ref 35–104)
ALT SERPL W P-5'-P-CCNC: 13 U/L (ref 10–35)
ANION GAP SERPL CALCULATED.3IONS-SCNC: 10 MMOL/L (ref 7–15)
AST SERPL W P-5'-P-CCNC: 22 U/L (ref 10–35)
BILIRUB SERPL-MCNC: 0.3 MG/DL
BUN SERPL-MCNC: 8.7 MG/DL (ref 6–20)
CALCIUM SERPL-MCNC: 9 MG/DL (ref 8.6–10)
CHLORIDE SERPL-SCNC: 102 MMOL/L (ref 98–107)
CHOLEST SERPL-MCNC: 145 MG/DL
CREAT SERPL-MCNC: 0.63 MG/DL (ref 0.51–0.95)
DEPRECATED HCO3 PLAS-SCNC: 26 MMOL/L (ref 22–29)
GFR SERPL CREATININE-BSD FRML MDRD: >90 ML/MIN/1.73M2
GLUCOSE SERPL-MCNC: 105 MG/DL (ref 70–99)
HDLC SERPL-MCNC: 47 MG/DL
LDLC SERPL CALC-MCNC: 85 MG/DL
NONHDLC SERPL-MCNC: 98 MG/DL
POTASSIUM SERPL-SCNC: 4.3 MMOL/L (ref 3.4–5.3)
PROT SERPL-MCNC: 7.2 G/DL (ref 6.4–8.3)
SODIUM SERPL-SCNC: 138 MMOL/L (ref 136–145)
TRIGL SERPL-MCNC: 67 MG/DL
TSH SERPL DL<=0.005 MIU/L-ACNC: 3.04 UIU/ML (ref 0.3–4.2)

## 2023-02-08 ENCOUNTER — HOSPITAL ENCOUNTER (EMERGENCY)
Facility: CLINIC | Age: 23
Discharge: HOME | End: 2023-02-09
Attending: EMERGENCY MEDICINE | Admitting: EMERGENCY MEDICINE
Payer: COMMERCIAL

## 2023-02-08 ENCOUNTER — APPOINTMENT (OUTPATIENT)
Dept: GENERAL RADIOLOGY | Facility: CLINIC | Age: 23
End: 2023-02-08
Attending: EMERGENCY MEDICINE
Payer: COMMERCIAL

## 2023-02-08 DIAGNOSIS — R07.81 PLEURITIC CHEST PAIN: ICD-10-CM

## 2023-02-08 DIAGNOSIS — R94.31 NONSPECIFIC ST-T WAVE ELECTROCARDIOGRAPHIC CHANGES: ICD-10-CM

## 2023-02-08 DIAGNOSIS — J06.9 VIRAL UPPER RESPIRATORY TRACT INFECTION: ICD-10-CM

## 2023-02-08 LAB
ANION GAP SERPL CALCULATED.3IONS-SCNC: 15 MMOL/L (ref 7–15)
BASOPHILS # BLD AUTO: 0.1 10E3/UL (ref 0–0.2)
BASOPHILS NFR BLD AUTO: 1 %
BUN SERPL-MCNC: 10.3 MG/DL (ref 6–20)
CALCIUM SERPL-MCNC: 8.9 MG/DL (ref 8.6–10)
CHLORIDE SERPL-SCNC: 101 MMOL/L (ref 98–107)
CREAT SERPL-MCNC: 0.67 MG/DL (ref 0.51–0.95)
D DIMER PPP FEU-MCNC: <0.27 UG/ML FEU (ref 0–0.5)
DEPRECATED HCO3 PLAS-SCNC: 21 MMOL/L (ref 22–29)
EOSINOPHIL # BLD AUTO: 0.6 10E3/UL (ref 0–0.7)
EOSINOPHIL NFR BLD AUTO: 5 %
ERYTHROCYTE [DISTWIDTH] IN BLOOD BY AUTOMATED COUNT: 12.1 % (ref 10–15)
GFR SERPL CREATININE-BSD FRML MDRD: >90 ML/MIN/1.73M2
GLUCOSE SERPL-MCNC: 106 MG/DL (ref 70–99)
HCG INTACT+B SERPL-ACNC: <1 MIU/ML
HCT VFR BLD AUTO: 44.2 % (ref 35–47)
HGB BLD-MCNC: 14.6 G/DL (ref 11.7–15.7)
HOLD SPECIMEN: NORMAL
HOLD SPECIMEN: NORMAL
IMM GRANULOCYTES # BLD: 0 10E3/UL
IMM GRANULOCYTES NFR BLD: 0 %
LYMPHOCYTES # BLD AUTO: 1.7 10E3/UL (ref 0.8–5.3)
LYMPHOCYTES NFR BLD AUTO: 14 %
MCH RBC QN AUTO: 29.3 PG (ref 26.5–33)
MCHC RBC AUTO-ENTMCNC: 33 G/DL (ref 31.5–36.5)
MCV RBC AUTO: 89 FL (ref 78–100)
MONOCYTES # BLD AUTO: 1 10E3/UL (ref 0–1.3)
MONOCYTES NFR BLD AUTO: 9 %
NEUTROPHILS # BLD AUTO: 8.5 10E3/UL (ref 1.6–8.3)
NEUTROPHILS NFR BLD AUTO: 71 %
NRBC # BLD AUTO: 0 10E3/UL
NRBC BLD AUTO-RTO: 0 /100
PLATELET # BLD AUTO: 242 10E3/UL (ref 150–450)
POTASSIUM SERPL-SCNC: 3.6 MMOL/L (ref 3.4–5.3)
RBC # BLD AUTO: 4.98 10E6/UL (ref 3.8–5.2)
SODIUM SERPL-SCNC: 137 MMOL/L (ref 136–145)
TROPONIN T SERPL HS-MCNC: <6 NG/L
WBC # BLD AUTO: 11.9 10E3/UL (ref 4–11)

## 2023-02-08 PROCEDURE — 85025 COMPLETE CBC W/AUTO DIFF WBC: CPT | Performed by: EMERGENCY MEDICINE

## 2023-02-08 PROCEDURE — 85379 FIBRIN DEGRADATION QUANT: CPT | Performed by: EMERGENCY MEDICINE

## 2023-02-08 PROCEDURE — 84484 ASSAY OF TROPONIN QUANT: CPT | Performed by: EMERGENCY MEDICINE

## 2023-02-08 PROCEDURE — 36415 COLL VENOUS BLD VENIPUNCTURE: CPT | Performed by: EMERGENCY MEDICINE

## 2023-02-08 PROCEDURE — 93005 ELECTROCARDIOGRAM TRACING: CPT | Mod: 59

## 2023-02-08 PROCEDURE — 93308 TTE F-UP OR LMTD: CPT

## 2023-02-08 PROCEDURE — 84702 CHORIONIC GONADOTROPIN TEST: CPT | Performed by: EMERGENCY MEDICINE

## 2023-02-08 PROCEDURE — 85014 HEMATOCRIT: CPT | Performed by: EMERGENCY MEDICINE

## 2023-02-08 PROCEDURE — 71046 X-RAY EXAM CHEST 2 VIEWS: CPT

## 2023-02-08 PROCEDURE — 80048 BASIC METABOLIC PNL TOTAL CA: CPT | Performed by: EMERGENCY MEDICINE

## 2023-02-08 PROCEDURE — 93005 ELECTROCARDIOGRAM TRACING: CPT | Mod: 76,59

## 2023-02-08 PROCEDURE — 99285 EMERGENCY DEPT VISIT HI MDM: CPT | Mod: 25

## 2023-02-08 RX ORDER — KETOROLAC TROMETHAMINE 15 MG/ML
15 INJECTION, SOLUTION INTRAMUSCULAR; INTRAVENOUS ONCE
Status: COMPLETED | OUTPATIENT
Start: 2023-02-09 | End: 2023-02-09

## 2023-02-08 RX ORDER — IPRATROPIUM BROMIDE AND ALBUTEROL SULFATE 2.5; .5 MG/3ML; MG/3ML
3 SOLUTION RESPIRATORY (INHALATION) ONCE
Status: COMPLETED | OUTPATIENT
Start: 2023-02-09 | End: 2023-02-09

## 2023-02-08 RX ORDER — SODIUM CHLORIDE 9 MG/ML
INJECTION, SOLUTION INTRAVENOUS CONTINUOUS
Status: DISCONTINUED | OUTPATIENT
Start: 2023-02-09 | End: 2023-02-09 | Stop reason: HOSPADM

## 2023-02-08 ASSESSMENT — ENCOUNTER SYMPTOMS
VOMITING: 0
NAUSEA: 0
COUGH: 1

## 2023-02-08 ASSESSMENT — ACTIVITIES OF DAILY LIVING (ADL): ADLS_ACUITY_SCORE: 35

## 2023-02-09 VITALS
HEART RATE: 109 BPM | WEIGHT: 154 LBS | RESPIRATION RATE: 18 BRPM | HEIGHT: 61 IN | OXYGEN SATURATION: 94 % | DIASTOLIC BLOOD PRESSURE: 65 MMHG | SYSTOLIC BLOOD PRESSURE: 127 MMHG | BODY MASS INDEX: 29.07 KG/M2 | TEMPERATURE: 100 F

## 2023-02-09 LAB
ATRIAL RATE - MUSE: 104 BPM
ATRIAL RATE - MUSE: 113 BPM
ATRIAL RATE - MUSE: 94 BPM
DIASTOLIC BLOOD PRESSURE - MUSE: NORMAL MMHG
INTERPRETATION ECG - MUSE: NORMAL
P AXIS - MUSE: 70 DEGREES
P AXIS - MUSE: 72 DEGREES
P AXIS - MUSE: 72 DEGREES
PR INTERVAL - MUSE: 106 MS
PR INTERVAL - MUSE: 112 MS
PR INTERVAL - MUSE: 114 MS
QRS DURATION - MUSE: 78 MS
QRS DURATION - MUSE: 78 MS
QRS DURATION - MUSE: 80 MS
QT - MUSE: 326 MS
QT - MUSE: 334 MS
QT - MUSE: 338 MS
QTC - MUSE: 422 MS
QTC - MUSE: 439 MS
QTC - MUSE: 447 MS
R AXIS - MUSE: 28 DEGREES
R AXIS - MUSE: 44 DEGREES
R AXIS - MUSE: 48 DEGREES
SYSTOLIC BLOOD PRESSURE - MUSE: NORMAL MMHG
T AXIS - MUSE: -10 DEGREES
T AXIS - MUSE: -19 DEGREES
T AXIS - MUSE: -21 DEGREES
VENTRICULAR RATE- MUSE: 104 BPM
VENTRICULAR RATE- MUSE: 113 BPM
VENTRICULAR RATE- MUSE: 94 BPM

## 2023-02-09 PROCEDURE — 96374 THER/PROPH/DIAG INJ IV PUSH: CPT

## 2023-02-09 PROCEDURE — 258N000003 HC RX IP 258 OP 636: Performed by: EMERGENCY MEDICINE

## 2023-02-09 PROCEDURE — 94640 AIRWAY INHALATION TREATMENT: CPT

## 2023-02-09 PROCEDURE — 250N000011 HC RX IP 250 OP 636: Performed by: EMERGENCY MEDICINE

## 2023-02-09 PROCEDURE — 96361 HYDRATE IV INFUSION ADD-ON: CPT

## 2023-02-09 PROCEDURE — 250N000009 HC RX 250: Performed by: EMERGENCY MEDICINE

## 2023-02-09 RX ADMIN — IPRATROPIUM BROMIDE AND ALBUTEROL SULFATE 3 ML: .5; 2.5 SOLUTION RESPIRATORY (INHALATION) at 00:20

## 2023-02-09 RX ADMIN — SODIUM CHLORIDE 1000 ML: 9 INJECTION, SOLUTION INTRAVENOUS at 00:19

## 2023-02-09 RX ADMIN — KETOROLAC TROMETHAMINE 15 MG: 15 INJECTION, SOLUTION INTRAMUSCULAR; INTRAVENOUS at 00:19

## 2023-02-09 ASSESSMENT — ACTIVITIES OF DAILY LIVING (ADL): ADLS_ACUITY_SCORE: 35

## 2023-02-09 NOTE — DISCHARGE INSTRUCTIONS
Please return to the ED if you have active chest pain, shortness of breath, nausea, sweatiness, or other acute changes.  Please follow up with your PCP in the next 2-3 days.      Please use tylenol and ibuprofen for pain.      Discharge Instructions  Chest Pain    You have been seen today for chest pain or discomfort.  At this time, your provider has found no signs that your chest pain is due to a serious or life-threatening condition, (or you have declined more testing and/or admission to the hospital). However, sometimes there is a serious problem that does not show up right away. Your evaluation today may not be complete and you may need further testing and evaluation.     Generally, every Emergency Department visit should have a follow-up clinic visit with either a primary or a specialty clinic/provider. Please follow-up as instructed by your emergency provider today.  Return to the Emergency Department if:  Your chest pain changes, gets worse, starts to happen more often, or comes with less activity.  You are newly short of breath.  You get very weak or tired.  You pass out or faint.  You have any new symptoms, like fever, cough, numb legs, or you cough up blood.  You have anything else that worries you.    Until you follow-up with your regular provider, please do the following:  Take one aspirin daily unless you have an allergy or are told not to by your provider.  If a stress test appointment has been made, go to the appointment.  If you have questions, contact your regular provider.  Follow-up with your regular provider/clinic as directed; this is very important.    If you were given a prescription for medicine here today, be sure to read all of the information (including the package insert) that comes with your prescription.  This will include important information about the medicine, its side effects, and any warnings that you need to know about.  The pharmacist who fills the prescription can provide more  information and answer questions you may have about the medicine.  If you have questions or concerns that the pharmacist cannot address, please call or return to the Emergency Department.       Remember that you can always come back to the Emergency Department if you are not able to see your regular provider in the amount of time listed above, if you get any new symptoms, or if there is anything that worries you.    Discharge Instructions  Upper Respiratory Infection    The upper respiratory tract includes the sinuses, nasal passages, pharynx, and larynx. A URI, or upper respiratory infection, is an infection of any of the parts of the upper airway. Symptoms include runny nose, congestion, sneezing, sore throat, cough, and fever. URIs are almost always caused by a virus. Antibiotics do not help with viral infections, so are generally not prescribed. A URI is very contagious through coughing and nasal secretions; make sure you wash your hands often and clean surfaces after sneezing, coughing or touching them. While you should start to improve in 3 - 5 days, remember that sometimes a cough can linger for several weeks.    Generally, every Emergency Department visit should have a follow-up clinic visit with either a primary or a specialty clinic/provider. Please follow-up as instructed by your emergency provider today.    Return to the Emergency Department if:  Any of your symptoms get much worse.  You seem very sick, like being too weak to get up.  You have chest pain or shortness of breath.   You have a severe headache.  You are vomiting (throwing up) so much you cannot keep fluids or medicines down.  You have confusion or seem unusually drowsy.  You have a seizure.    What can I do to help myself?  Fill any prescriptions the provider gave you and take them right away  If you have a fever, get plenty of rest and drink lots of fluids, especially water.  Using a humidifier or saline nose spray will also help loosen  mucous.   Clothes or blankets will not change your fever. Do what is comfortable for you.  Bathing or sponging in lukewarm water may help you feel better.  Acetaminophen (Tylenol ) or ibuprofen (Advil , Motrin ) will help bring fever down and may help you feel more comfortable. Be sure to read and follow the package directions, and ask your provider if you have questions.  Do not drink alcohol.  Decongestants may help you feel better. You may use decongestant nose sprays Afrin  (oxymetazoline) or Barney-Synephrine  (phenylephrine hydrochloride) for up to 3 days, or may use a decongestant tablet like Sudafed  (pseudoephedrine).  If you were given a prescription for medicine here today, be sure to read all of the information (including the package insert) that comes with your prescription.  This will include important information about the medicine, its side effects, and any warnings that you need to know about.  The pharmacist who fills the prescription can provide more information and answer questions you may have about the medicine.  If you have questions or concerns that the pharmacist cannot address, please call or return to the Emergency Department.   Remember that you can always come back to the Emergency Department if you are not able to see your regular provider in the amount of time listed above, if you get any new symptoms, or if there is anything that worries you.

## 2023-02-09 NOTE — ED PROVIDER NOTES
"  History     Chief Complaint:  Chest Pain     The history is provided by the patient and the spouse.      Cookie Wall is a 22 year old female with a history of exercise-induced asthma, who presents with chest pain. Patient endorses cough, sneezing, and cold symptoms for about 1 week. This morning, she woke up with worsening chest pain. Her  states she feels like \"her lungs are almost squishing her heart.\" Patient notes the pain is worse with deep breaths. Position reportedly does not affect her chest pain. Her cough was dry before, but is now more productive and she reports coughing up yellow mucus. She states she has exercise-induced asthma. Patient reports that her smart watch said she had a pulse of 133 bpm while walking around the mall earlier today. She reports no nausea, vomiting, or hemoptysis. She traveled to [place] about a month ago via plane. She states there is mold in her garage which she notes she's allergic to. She tested negative for COVID this morning at home. She took two tablets of Claritin today. She reports she tried to use her albuterol nebulizer, but it broke. She did not use her inhaler which she was prescribed to use as needed. No significant respiratory or cardiac history. Patient reports family history of lung cancer and tobacco use. She reports no personal history of tobacco use, alcohol use, or recreational drug use. No known chemical exposures reported.     ED workup with with troponin, pregnancy test, D dimer, basic metabolic panel, CBC and mild chest x-ray, bedside ultrasound for pericarditis all within normal limits. Workup also with multiple EKGs given concern for potential depression in 2, 3, AVF, posterior EKG obtained (without clear ST elevation in leads 6-9, no STEMI) repeat, EKG with subtle improvement in depressions in inferior leads. moderate    Independent Historian:    Patient and      Review of External Notes: none     ROS:  Review of Systems "   Respiratory: Positive for cough (nonbloody).    Cardiovascular: Positive for chest pain.   Gastrointestinal: Negative for nausea and vomiting.   All other systems reviewed and are negative.      Allergies:  Cats  Grass  Trees  Dogs  Dust Mites     Medications:    albuterol (PROAIR HFA/PROVENTIL HFA/VENTOLIN HFA) 108 (90 Base) MCG/ACT inhaler  etonogestrel (NEXPLANON) 68 MG IMPL        Past Medical History:    Past Medical History:   Diagnosis Date     Acute bronchiolitis due to other infectious organisms 12/06/2003     Allergy, initial encounter 2016     Anal fissure 11/04/2005     Bacteremia 12/06/2003     Depression 2021     FEBRILE SEIZURE 12/03/2003     Major depression, single episode 1/25/2023     Pneumonia, organism unspecified(486) 12/06/2003       Past Surgical History:    Past Surgical History:   Procedure Laterality Date     INSERT CONTRACEPTIVE IMPLANT - NEXPLANON/IMPLANON  03/28/2021    nexplanon - insertion        Family History:    family history includes Allergies in her father and mother; Anxiety Disorder in her mother; Autism Spectrum Disorder in her brother; Breast Cancer (age of onset: 39) in her paternal grandmother; Cancer in an other family member; Depression in her brother and mother; Depression/Anxiety in her mother; Diabetes in her mother; Heart Disease in her father and other family members; Hyperlipidemia in her father and paternal grandfather; Hypertension in her father, maternal grandfather, paternal grandfather, and another family member; Mental Illness in her brother and mother; Obesity in her mother; Other Cancer in her paternal grandfather.    Social History:  Patient presents to the ED via private vehicle with her    PCP: Italia River     Physical Exam     Patient Vitals for the past 24 hrs:   BP Temp Temp src Pulse Resp SpO2 Height Weight   02/08/23 2315 (!) 140/84 -- -- 110 16 95 % -- --   02/08/23 2300 136/89 -- -- 117 19 97 % -- --   02/08/23 2245 (!) 130/100  "-- -- 114 26 96 % -- --   02/08/23 2230 (!) 148/102 -- -- 113 20 97 % -- --   02/08/23 2003 (!) 152/98 100  F (37.8  C) Temporal 118 18 98 % 1.549 m (5' 1\") 69.9 kg (154 lb)        Physical Exam    General: Resting on the bed.  Head: No obvious trauma to head.  Ears, Nose, Throat:  External ears normal.  Nose normal.  No pharyngeal erythema, swelling or exudate.  Midline uvula.    Eyes:  Conjunctivae clear.  Pupils are equal, round, and reactive.   Neck: Normal range of motion.  Neck supple.   CV: Regular rate and rhythm.  No murmurs.      Respiratory: Effort normal and breath sounds normal.  No wheezing or crackles.   Gastrointestinal: Soft.  No distension. There is no tenderness.  There is no rigidity, no rebound and no guarding.   Musculoskeletal: Normal range of motion.  Non tender extremities to palpations.    Neuro: Alert. Moving all extremities appropriately.  Normal speech.    Skin: Skin is warm and dry.  No rash noted.   Psych: Normal mood and affect. Behavior is normal.     Emergency Department Course   ECG  ECG results from 02/08/23   EKG 12-lead, tracing only     Value    Systolic Blood Pressure     Diastolic Blood Pressure     Ventricular Rate 94    Atrial Rate 94    NC Interval 106    QRS Duration 78        QTc 422    P Axis 70    R AXIS 44    T Axis -10    Interpretation ECG      Sinus rhythm with short NC  Nonspecific ST abnormality  Abnormal ECG  No previous ECGs available     EKG 12 lead #2     Value    Systolic Blood Pressure     Diastolic Blood Pressure     Ventricular Rate 104    Atrial Rate 104    NC Interval 112    QRS Duration 80        QTc 439    P Axis 72    R AXIS 28    T Axis -21    Interpretation ECG      Sinus tachycardia  Right atrial enlargement  ST & T wave abnormality, consider inferior ischemia  Abnormal ECG  When compared with ECG of 08-FEB-2023 20:45, (unconfirmed)  No significant change was found    POSTERIOR EKG        ECG #3:  ECG taken at 2339, ECG read on " 2/8/23  Sinus tachycardia  Possible left atrial enlargement  Nonspecific ST abnormality  Abnormal QRS-T angle, consider primary T wave abnormality   No significant change as compared to prior, dated 2/8/23.  Rate 113 bpm. NC interval 114 ms. QRS duration 78 ms. QT/QTc 326/447 ms. P-R-T axes 72 48 -19.     Imaging:  POC US ECHO LIMITED   Final Result   Limited Bedside Cardiac Ultrasound, performed and interpreted by me.    Indication: Chest Pain.   Parasternal long axis, parasternal short axis and subcostal views were acquired.    Image quality was satisfactory.      Findings:     Global left ventricular function appears intact.   Chambers do not appear dilated.   There is no evidence of free fluid within the pericardium.      IMPRESSION: Grossly normal left ventricular function and chamber size.  No pericardial effusion..      Chest XR,  PA & LAT   Final Result   IMPRESSION: Negative chest.        Report per radiology    Laboratory:  Labs Ordered and Resulted from Time of ED Arrival to Time of ED Departure   BASIC METABOLIC PANEL - Abnormal       Result Value    Sodium 137      Potassium 3.6      Chloride 101      Carbon Dioxide (CO2) 21 (*)     Anion Gap 15      Urea Nitrogen 10.3      Creatinine 0.67      Calcium 8.9      Glucose 106 (*)     GFR Estimate >90     CBC WITH PLATELETS AND DIFFERENTIAL - Abnormal    WBC Count 11.9 (*)     RBC Count 4.98      Hemoglobin 14.6      Hematocrit 44.2      MCV 89      MCH 29.3      MCHC 33.0      RDW 12.1      Platelet Count 242      % Neutrophils 71      % Lymphocytes 14      % Monocytes 9      % Eosinophils 5      % Basophils 1      % Immature Granulocytes 0      NRBCs per 100 WBC 0      Absolute Neutrophils 8.5 (*)     Absolute Lymphocytes 1.7      Absolute Monocytes 1.0      Absolute Eosinophils 0.6      Absolute Basophils 0.1      Absolute Immature Granulocytes 0.0      Absolute NRBCs 0.0     TROPONIN T, HIGH SENSITIVITY - Normal    Troponin T, High Sensitivity <6      HCG QUANTITATIVE PREGNANCY - Normal    hCG Quantitative <1     D DIMER QUANTITATIVE - Normal    D-Dimer Quantitative <0.27        Procedures       Emergency Department Course & Assessments:     Interventions:  Medications   0.9% sodium chloride BOLUS (1,000 mLs Intravenous New Bag 2/9/23 0019)     Followed by   sodium chloride 0.9% infusion (has no administration in time range)   ketorolac (TORADOL) injection 15 mg (15 mg Intravenous Given 2/9/23 0019)   ipratropium - albuterol 0.5 mg/2.5 mg/3 mL (DUONEB) neb solution 3 mL (3 mLs Nebulization Given 2/9/23 0020)        Independent Interpretation (X-rays, CTs, rhythm strip):  US - as above    Social Determinants of Health affecting care:  None       Assessments/Consultations/Discussion of Management or Tests:   ED Course as of 02/09/23 0025   Wed Feb 08, 2023   2218 Troponin T, High Sensitivity   2218 D dimer quantitative   2218 CBC with platelets differential(!)   2218 Basic metabolic panel(!)   2310 Chest XR,  PA & LAT   Thu Feb 09, 2023   0007 POC US ECHO LIMITED   0020 EKG 12-lead, tracing only      Disposition:  Discharge    Impression & Plan    CMS Diagnoses: None    Medical Decision Making:    Cookie Wall is a 22 year old female who presents for evaluation of chest pain for greater than 8 hours. Initial laboratory and imaging tests have come back normal after greater than eight hours of constant pain without progression of symptoms or other new concerning symptoms. Moreover, patient with known recent upper respiratory symptoms and patient presentation consistent with pleuritic chest pain in context of recent upper respiratory infection with significant cough. There is no clinical, laboratory, or radiographic evidence of pulmonary embolism, aortic dissection, pneumonia, pneumothorax or cardiac ischemia.  Other etiologies of chest pain considered included myocarditis, pericarditis, acute valvular insufficiency, chest wall source, esophageal spasm or GI  source, pleuritis (but normal bedside ultrasound without obvious pleuritic changes), referred pain, etc.  I have no suspicion of unstable angina at this point and will therefore not admit formally and administer heparin.  I recommend patient to follow-up with cardiology within following one week. The patient agrees with the plan and all questions were answered.      Diagnosis:    ICD-10-CM    1. Pleuritic chest pain  R07.81            Discharge Medications:  New Prescriptions    No medications on file      Scribe Disclosure:  I, Theresa Monahan, am serving as a scribe at 11:18 PM on 2/8/2023 to document services personally performed by Alley Lr MD based on my observations and the provider's statements to me.    2/8/2023   Alley Lr MD Haile, Sofia, MD  02/09/23 0027

## 2023-02-09 NOTE — ED TRIAGE NOTES
Here for chest pain and sob for few days associated with bilateral rib pain and coughing up sputum. ABCs intact.      Triage Assessment     Row Name 02/2002       Triage Assessment (Adult)    Airway WDL WDL       Respiratory WDL    Respiratory WDL rhythm/pattern    Rhythm/Pattern, Respiratory shortness of breath       Cardiac WDL    Cardiac WDL chest pain

## 2023-02-10 ENCOUNTER — OFFICE VISIT (OUTPATIENT)
Dept: CARDIOLOGY | Facility: CLINIC | Age: 23
End: 2023-02-10
Payer: COMMERCIAL

## 2023-02-10 VITALS
SYSTOLIC BLOOD PRESSURE: 116 MMHG | OXYGEN SATURATION: 95 % | HEART RATE: 88 BPM | WEIGHT: 153.3 LBS | HEIGHT: 60 IN | DIASTOLIC BLOOD PRESSURE: 73 MMHG | BODY MASS INDEX: 30.1 KG/M2

## 2023-02-10 DIAGNOSIS — R94.31 NONSPECIFIC ST-T WAVE ELECTROCARDIOGRAPHIC CHANGES: ICD-10-CM

## 2023-02-10 DIAGNOSIS — I45.6 SHORT PR-NORMAL QRS COMPLEX SYNDROME: ICD-10-CM

## 2023-02-10 DIAGNOSIS — J45.51 SEVERE PERSISTENT ASTHMA WITH ACUTE EXACERBATION (H): Primary | ICD-10-CM

## 2023-02-10 PROCEDURE — 99204 OFFICE O/P NEW MOD 45 MIN: CPT | Performed by: INTERNAL MEDICINE

## 2023-02-10 NOTE — PROGRESS NOTES
CARDIOLOGY CLINIC CONSULTATION      REASON FOR CONSULT:   Abnormal EKG    PRIMARY CARE PHYSICIAN:  Italia River        History of Present Illness   Cookie Wall is an extremely pleasant 22 year old female here as a new patient to establish care.  Her past medical history is significant for what sounds like fairly severe persistent asthma.  No family history of heart disease that she is aware of.  She is a never smoker.  She does not drink alcohol or use illicit drugs.  She only rarely drinks caffeine.  She does exercise regularly by walk/jogging around 4 miles at a time, though she needs to bring an inhaler with if needed.    She was referred to cardiology clinic after a recent ER visit.  She has had issues in the past of developing severe asthma exacerbations after viral illnesses.  She experience what sounds like another viral illness last week, and this progressed to the point that she became quite short of breath and had chest tightness and eventually presented to the emergency department at [facility] on 2/8/2023.  Her work-up there showed normal electrolytes, normal renal function, undetectable high-sensitivity troponin, undetectable D-dimer, mildly elevated white cell count with otherwise normal CBC, and a normal chest x-ray.  In addition, she had several ECGs which I personally reviewed, and these all show sinus rhythm with short FL interval but no delta wave, borderline right atrial enlargement, and varying ST/T wave abnormalities which are nonspecific but more than would be expected for her age.  She had a bedside cardiac ultrasound in the emergency department which was reported as grossly normal, but she has not had a formal echocardiogram.      Assessment & Plan     1. Chest tightness, likely related to asthma exacerbation, improving  2. Diffuse nonspecific ST/T wave abnormalities, more than would be expected for age  3. Right atrial enlargement on ECG  4. 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  In regards to her episode of chest tightness, I think that this likely is simply due to her asthma exacerbation rather than any cardiovascular etiology such as coronary ischemia, venous thromboembolism, etc.  That said, while young healthy patients can be expected to have some particular ECG changes that are not necessarily abnormal, her ECG changes are more than would be expected.  Accordingly, I think that we should check an echocardiogram to evaluate for any evidence of structural heart disease, particularly right-sided heart disease, in case her asthma (which sounds quite severe) is causing some increased strain on the right side of her heart.  In addition, given her short WI interval, I think that a 7-day Zio patch would be appropriate to assess for any type of subclinical dysrhythmia.      -TTE, with particular focus on RV  -7-day Zio patch  -Further plans pending results of above testing  -Asthma management per primary/allergy        Follow-up: 1 month to review above test results          Clemente Shaw MD  Interventional Cardiology  February 10, 2023        Medications   Current Outpatient Medications   Medication     albuterol (PROAIR HFA/PROVENTIL HFA/VENTOLIN HFA) 108 (90 Base) MCG/ACT inhaler     etonogestrel (NEXPLANON) 68 MG IMPL     Current Facility-Administered Medications   Medication     etonogestrel (NEXPLANON) subdermal implant 68 mg     Allergies   Allergies   Allergen Reactions     Cats Shortness Of Breath and Hives     Grass Itching     Trees      Sneezing, runny nose     Dogs Hives and Itching     Runny nose,      Dust Mites      Itchy eyes          Physical Exam       BP: 116/73 Pulse: 88     SpO2: 95 % (room air)      Vital Signs with Ranges  Pulse:  [88] 88  BP: (116)/(73) 116/73  SpO2:  [95 %] 95 %  153 lbs 4.8 oz    Constitutional: Well-appearing, no acute distress  Respiratory: Normal respiratory effort,  moderate diffuse wheezing noted  Cardiovascular: RRR, no m/r/g.  JVP < 7 cm H2O.  There is no LE edema.  Normal carotid upstrokes, no carotid bruits.

## 2023-02-10 NOTE — LETTER
2/10/2023    Italia River MD  [id number]  [facility] [id number]    RE: Cookie Wall       Dear Colleague,     I had the pleasure of seeing Cookie Wall in the [facility].  CARDIOLOGY CLINIC CONSULTATION      REASON FOR CONSULT:   Abnormal EKG    PRIMARY CARE PHYSICIAN:  Italia River        History of Present Illness   Cookie Wall is an extremely pleasant 22 year old female here as a new patient to establish care.  Her past medical history is significant for what sounds like fairly severe persistent asthma.  No family history of heart disease that she is aware of.  She is a never smoker.  She does not drink alcohol or use illicit drugs.  She only rarely drinks caffeine.  She does exercise regularly by walk/jogging around 4 miles at a time, though she needs to bring an inhaler with if needed.    She was referred to cardiology clinic after a recent ER visit.  She has had issues in the past of developing severe asthma exacerbations after viral illnesses.  She experience what sounds like another viral illness last week, and this progressed to the point that she became quite short of breath and had chest tightness and eventually presented to the emergency department at [facility] [place] [facility] on 2/8/2023.  Her work-up there showed normal electrolytes, normal renal function, undetectable high-sensitivity troponin, undetectable D-dimer, mildly elevated white cell count with otherwise normal CBC, and a normal chest x-ray.  In addition, she had several ECGs which I personally reviewed, and these all show sinus rhythm with short VA interval but no delta wave, borderline right atrial enlargement, and varying ST/T wave abnormalities which are nonspecific but more than would be expected for her age.  She had a bedside cardiac ultrasound in the emergency department which was reported as grossly normal, but she has not had a formal echocardiogram.      Assessment & Plan      1. Chest tightness, likely related to asthma exacerbation, improving  2. Diffuse nonspecific ST/T wave abnormalities, more than would be expected for age  3. Right atrial enlargement on ECG  4. Short SD syndrome without delta wave      It was a pleasure to meet with Cookie in clinic today.  In regards to her episode of chest tightness, I think that this likely is simply due to her asthma exacerbation rather than any cardiovascular etiology such as coronary ischemia, venous thromboembolism, etc.  That said, while young healthy patients can be expected to have some particular ECG changes that are not necessarily abnormal, her ECG changes are more than would be expected.  Accordingly, I think that we should check an echocardiogram to evaluate for any evidence of structural heart disease, particularly right-sided heart disease, in case her asthma (which sounds quite severe) is causing some increased strain on the right side of her heart.  In addition, given her short SD interval, I think that a 7-day Zio patch would be appropriate to assess for any type of subclinical dysrhythmia.      -TTE, with particular focus on RV  -7-day Zio patch  -Further plans pending results of above testing  -Asthma management per primary/allergy        Follow-up: 1 month to review above test results          Clemente Shaw MD  Interventional Cardiology  February 10, 2023        Medications   Current Outpatient Medications   Medication     albuterol (PROAIR HFA/PROVENTIL HFA/VENTOLIN HFA) 108 (90 Base) MCG/ACT inhaler     etonogestrel (NEXPLANON) 68 MG IMPL     Current Facility-Administered Medications   Medication     etonogestrel (NEXPLANON) subdermal implant 68 mg     Allergies   Allergies   Allergen Reactions     Cats Shortness Of Breath and Hives     Grass Itching     Trees      Sneezing, runny nose     Dogs Hives and Itching     Runny nose,      Dust Mites      Itchy eyes          Physical Exam       BP: 116/73 Pulse: 88      SpO2: 95 % (room air)      Vital Signs with Ranges  Pulse:  [88] 88  BP: (116)/(73) 116/73  SpO2:  [95 %] 95 %  153 lbs 4.8 oz    Constitutional: Well-appearing, no acute distress  Respiratory: Normal respiratory effort, moderate diffuse wheezing noted  Cardiovascular: RRR, no m/r/g.  JVP < 7 cm H2O.  There is no LE edema.  Normal carotid upstrokes, no carotid bruits.    Thank you for allowing me to participate in the care of your patient.      Sincerely,     Clemente Shaw MD     [facility]  cc:   Italia Colvin MD  [facility]  [address] [place] [address]

## 2023-02-17 ENCOUNTER — HOSPITAL ENCOUNTER (OUTPATIENT)
Dept: CARDIOLOGY | Facility: CLINIC | Age: 23
Discharge: HOME | End: 2023-02-17
Attending: INTERNAL MEDICINE | Admitting: INTERNAL MEDICINE
Payer: COMMERCIAL

## 2023-02-17 DIAGNOSIS — J45.51 SEVERE PERSISTENT ASTHMA WITH ACUTE EXACERBATION (H): ICD-10-CM

## 2023-02-17 DIAGNOSIS — I45.6 SHORT PR-NORMAL QRS COMPLEX SYNDROME: ICD-10-CM

## 2023-02-17 DIAGNOSIS — R94.31 NONSPECIFIC ST-T WAVE ELECTROCARDIOGRAPHIC CHANGES: ICD-10-CM

## 2023-02-17 LAB — LVEF ECHO: NORMAL

## 2023-02-17 PROCEDURE — 93306 TTE W/DOPPLER COMPLETE: CPT | Mod: 26 | Performed by: INTERNAL MEDICINE

## 2023-02-17 PROCEDURE — 93306 TTE W/DOPPLER COMPLETE: CPT

## 2023-03-03 ENCOUNTER — HOSPITAL ENCOUNTER (OUTPATIENT)
Dept: CARDIOLOGY | Facility: CLINIC | Age: 23
Discharge: HOME | End: 2023-03-03
Attending: INTERNAL MEDICINE | Admitting: INTERNAL MEDICINE
Payer: COMMERCIAL

## 2023-03-03 DIAGNOSIS — R94.31 NONSPECIFIC ST-T WAVE ELECTROCARDIOGRAPHIC CHANGES: ICD-10-CM

## 2023-03-03 DIAGNOSIS — J45.51 SEVERE PERSISTENT ASTHMA WITH ACUTE EXACERBATION (H): ICD-10-CM

## 2023-03-03 DIAGNOSIS — I45.6 SHORT PR-NORMAL QRS COMPLEX SYNDROME: ICD-10-CM

## 2023-03-03 PROCEDURE — 93244 EXT ECG>48HR<7D REV&INTERPJ: CPT | Performed by: INTERNAL MEDICINE

## 2023-03-03 PROCEDURE — 93242 EXT ECG>48HR<7D RECORDING: CPT

## 2023-04-24 ASSESSMENT — PATIENT HEALTH QUESTIONNAIRE - PHQ9
10. IF YOU CHECKED OFF ANY PROBLEMS, HOW DIFFICULT HAVE THESE PROBLEMS MADE IT FOR YOU TO DO YOUR WORK, TAKE CARE OF THINGS AT HOME, OR GET ALONG WITH OTHER PEOPLE: SOMEWHAT DIFFICULT
SUM OF ALL RESPONSES TO PHQ QUESTIONS 1-9: 15
SUM OF ALL RESPONSES TO PHQ QUESTIONS 1-9: 15

## 2023-04-24 ASSESSMENT — ASTHMA QUESTIONNAIRES
QUESTION_2 LAST FOUR WEEKS HOW OFTEN HAVE YOU HAD SHORTNESS OF BREATH: ONCE OR TWICE A WEEK
QUESTION_5 LAST FOUR WEEKS HOW WOULD YOU RATE YOUR ASTHMA CONTROL: WELL CONTROLLED
QUESTION_1 LAST FOUR WEEKS HOW MUCH OF THE TIME DID YOUR ASTHMA KEEP YOU FROM GETTING AS MUCH DONE AT WORK, SCHOOL OR AT HOME: A LITTLE OF THE TIME
QUESTION_4 LAST FOUR WEEKS HOW OFTEN HAVE YOU USED YOUR RESCUE INHALER OR NEBULIZER MEDICATION (SUCH AS ALBUTEROL): ONCE A WEEK OR LESS
ACT_TOTALSCORE: 21
QUESTION_3 LAST FOUR WEEKS HOW OFTEN DID YOUR ASTHMA SYMPTOMS (WHEEZING, COUGHING, SHORTNESS OF BREATH, CHEST TIGHTNESS OR PAIN) WAKE YOU UP AT NIGHT OR EARLIER THAN USUAL IN THE MORNING: NOT AT ALL
EMERGENCY_ROOM_LAST_YEAR_TOTAL: ONE
ACT_TOTALSCORE: 21

## 2023-04-24 ASSESSMENT — ANXIETY QUESTIONNAIRES
7. FEELING AFRAID AS IF SOMETHING AWFUL MIGHT HAPPEN: MORE THAN HALF THE DAYS
7. FEELING AFRAID AS IF SOMETHING AWFUL MIGHT HAPPEN: MORE THAN HALF THE DAYS
6. BECOMING EASILY ANNOYED OR IRRITABLE: MORE THAN HALF THE DAYS
IF YOU CHECKED OFF ANY PROBLEMS ON THIS QUESTIONNAIRE, HOW DIFFICULT HAVE THESE PROBLEMS MADE IT FOR YOU TO DO YOUR WORK, TAKE CARE OF THINGS AT HOME, OR GET ALONG WITH OTHER PEOPLE: SOMEWHAT DIFFICULT
2. NOT BEING ABLE TO STOP OR CONTROL WORRYING: SEVERAL DAYS
8. IF YOU CHECKED OFF ANY PROBLEMS, HOW DIFFICULT HAVE THESE MADE IT FOR YOU TO DO YOUR WORK, TAKE CARE OF THINGS AT HOME, OR GET ALONG WITH OTHER PEOPLE?: SOMEWHAT DIFFICULT
GAD7 TOTAL SCORE: 12
3. WORRYING TOO MUCH ABOUT DIFFERENT THINGS: MORE THAN HALF THE DAYS
GAD7 TOTAL SCORE: 12
4. TROUBLE RELAXING: NEARLY EVERY DAY
1. FEELING NERVOUS, ANXIOUS, OR ON EDGE: SEVERAL DAYS
5. BEING SO RESTLESS THAT IT IS HARD TO SIT STILL: SEVERAL DAYS

## 2023-04-25 ENCOUNTER — VIRTUAL VISIT (OUTPATIENT)
Dept: FAMILY MEDICINE | Facility: CLINIC | Age: 23
End: 2023-04-25
Attending: FAMILY MEDICINE
Payer: COMMERCIAL

## 2023-04-25 DIAGNOSIS — Z53.8 APPOINTMENT CANCELED BY HOSPITAL: Primary | ICD-10-CM

## 2023-04-25 NOTE — PROGRESS NOTES
[place] of [place] do    Answers for HPI/ROS submitted by the patient on 4/24/2023  If you checked off any problems, how difficult have these problems made it for you to do your work, take care of things at home, or get along with other people?: Somewhat difficult  PHQ9 TOTAL SCORE: 15  MAIKOL 7 TOTAL SCORE: 12  Depression/Anxiety: Depression & Anxiety  Status since last visit:: good  Anxiety since last: : better  Other associated symptoms of depression:: No  Other associated symotome: : Yes  Significant life event: : job concerns  Anxious:: No  Current substance use:: No

## 2024-01-03 ENCOUNTER — TELEPHONE (OUTPATIENT)
Dept: FAMILY MEDICINE | Facility: CLINIC | Age: 24
End: 2024-01-03
Payer: COMMERCIAL

## 2024-01-03 NOTE — TELEPHONE ENCOUNTER
Patient Quality Outreach    Patient is due for the following:   Asthma  -  AAP  Depression  -  DAP  Physical Preventive Adult Physical  Chlamydia Screening    Next Steps:   Patient no longer lives in [place] and currently resides in [place].    Type of outreach:    Phone, spoke to patient/parent. Patient-Cookie      Questions for provider review:    None           Magy Hart, CMA

## 2024-03-14 ENCOUNTER — TELEPHONE (OUTPATIENT)
Dept: FAMILY MEDICINE | Facility: CLINIC | Age: 24
End: 2024-03-14
Payer: COMMERCIAL

## 2024-03-14 NOTE — TELEPHONE ENCOUNTER
Patient Quality Outreach    Patient is due for the following:   Asthma  -  AAP  Cervical Cancer Screening - PAP Needed  Depression  -  DAP  Physical Preventive Adult Physical  Chlamydia Screening    Next Steps:   Schedule a Adult Preventative    Type of outreach:    Sent F[employer] message.      Questions for provider review:    None           Magy Hart, CMA

## 2024-03-14 NOTE — LETTER
[facility] [place]  [address] [facility] [address]  [phone]  March 14, 2024    Cookie Wall  [address] [address] [facility] [address] [address]    Dear Cookie,    I care about your health and have reviewed your health plan. I have reviewed your medical conditions, medication list, and lab results and am making recommendations based on this review, to better manage your health.    You are in particular need of attention regarding:  -Asthma  -Cervical Cancer Screening  -Wellness (Physical) Visit   -Chlamydia Screening    I am recommending that you:  {recommendations:-schedule a WELLNESS (Physical) APPOINTMENT with me.   I will check fasting labs the same day - nothing to eat except water and meds for 8-10 hours prior. (If you go elsewhere for Wellness visits then please disregard this reminder.)    Here is a list of Health Maintenance topics that are due now or due soon:  Health Maintenance Due   Topic Date Due    CHLAMYDIA SCREENING  Never done    ASTHMA ACTION PLAN  Never done    DEPRESSION ACTION PLAN  Never done    Pneumococcal Vaccine: Pediatrics (0 to 5 Years) and At-Risk Patients (6 to 64 Years) (1 of 2 - PCV) 12/26/2008    INFLUENZA VACCINE (1) 09/01/2023    COVID-19 Vaccine (3 - 2023-24 season) 09/01/2023    ASTHMA CONTROL TEST  10/25/2023    PHQ-9  10/25/2023    PAP  Never done    YEARLY PREVENTIVE VISIT  01/25/2024    ANNUAL REVIEW OF HM ORDERS  01/25/2024       Please call us at [phone] (or use Epos) to address the above recommendations.     Thank you for trusting [facility] and we appreciate the opportunity to serve you.  We look forward to supporting your healthcare needs in the future.    Healthy Regards,    Casi De León MD

## 2024-04-13 ENCOUNTER — HEALTH MAINTENANCE LETTER (OUTPATIENT)
Age: 24
End: 2024-04-13

## 2025-04-19 ENCOUNTER — HEALTH MAINTENANCE LETTER (OUTPATIENT)
Age: 25
End: 2025-04-19